# Patient Record
Sex: MALE | Race: OTHER
[De-identification: names, ages, dates, MRNs, and addresses within clinical notes are randomized per-mention and may not be internally consistent; named-entity substitution may affect disease eponyms.]

---

## 2017-09-12 ENCOUNTER — HOSPITAL ENCOUNTER (OUTPATIENT)
Dept: HOSPITAL 74 - JASU-ENDO | Age: 57
Discharge: HOME | End: 2017-09-12
Attending: INTERNAL MEDICINE
Payer: COMMERCIAL

## 2017-09-12 VITALS — SYSTOLIC BLOOD PRESSURE: 109 MMHG | HEART RATE: 65 BPM | DIASTOLIC BLOOD PRESSURE: 68 MMHG

## 2017-09-12 VITALS — BODY MASS INDEX: 30.4 KG/M2

## 2017-09-12 VITALS — TEMPERATURE: 97.8 F

## 2017-09-12 DIAGNOSIS — K29.50: Primary | ICD-10-CM

## 2017-09-12 PROCEDURE — 0DB68ZX EXCISION OF STOMACH, VIA NATURAL OR ARTIFICIAL OPENING ENDOSCOPIC, DIAGNOSTIC: ICD-10-PCS | Performed by: INTERNAL MEDICINE

## 2017-09-12 PROCEDURE — 0DB58ZX EXCISION OF ESOPHAGUS, VIA NATURAL OR ARTIFICIAL OPENING ENDOSCOPIC, DIAGNOSTIC: ICD-10-PCS | Performed by: INTERNAL MEDICINE

## 2017-09-13 NOTE — PATH
Surgical Pathology Report



Patient Name:  JOSTIN TRUJILLO

Accession #:  [id number]

[facility]. Rec. #:  [id number]                                                        

   /Age/Gender:  1960 (Age: 57) / M

Account:  [id number]                                                          

             Location: [facility]-ENDOSCOPY

Taken:  2017

Received:  2017

Reported:  2017

Physicians:  Ernesto Cooper M.D.

  



Specimen(s) Received

A: BX ERYTHEMA BODY 

B: BX GE JUNCTION 





Clinical History

Epigastric pain

Erythema body, irregular Z-line







Final Diagnosis

A. STOMACH, BODY, ERYTHEMA, BIOPSY:  

GASTRIC OXYNTIC MUCOSA WITH MILD TO MODERATE CHRONIC GASTRITIS WITH FOCAL

SURFACE EROSION.

IMMUNOSTAIN FOR H. PYLORI IS NEGATIVE FOR ORGANISMS.



B. GE JUNCTION, IRREGULAR Z-LINE, BIOPSY:  

SQUAMOCOLUMNAR JUNCTIONAL MUCOSA IS ACTIVE AND CHRONIC INFLAMMATION AND REFLUX

TYPE CHANGES.

NO INTESTINAL METAPLASIA (OTERO'S ESOPHAGUS) IDENTIFIED.





***Electronically Signed***

Alexander Finkelstein, M.D.





Gross Description

A.  Received in formalin, labeled "biopsy erosion body" are 2 tan, irregular

portions of soft tissue measuring 0.2 and 0.5 cm in greatest dimension. The

specimens are submitted in toto in one cassette.



B.  Received in formalin, labeled "biopsy of GE junction irregular Z-line" are 2

tan, irregular portions of soft tissue averaging 0.3 cm in greatest dimension.

The specimens are submitted in toto in one cassette.

## 2017-10-29 ENCOUNTER — HOSPITAL ENCOUNTER (OUTPATIENT)
Dept: HOSPITAL 74 - JER | Age: 57
Setting detail: OBSERVATION
LOS: 1 days | Discharge: LEFT BEFORE DISCHARGE | End: 2017-10-30
Attending: NURSE PRACTITIONER | Admitting: INTERNAL MEDICINE
Payer: COMMERCIAL

## 2017-10-29 VITALS — TEMPERATURE: 98.6 F

## 2017-10-29 VITALS — BODY MASS INDEX: 30.4 KG/M2

## 2017-10-29 DIAGNOSIS — Z87.891: ICD-10-CM

## 2017-10-29 DIAGNOSIS — R07.89: Primary | ICD-10-CM

## 2017-10-29 DIAGNOSIS — K21.9: ICD-10-CM

## 2017-10-29 DIAGNOSIS — E78.5: ICD-10-CM

## 2017-10-29 DIAGNOSIS — I95.9: ICD-10-CM

## 2017-10-29 LAB
ALBUMIN SERPL-MCNC: 3.9 G/DL (ref 3.4–5)
ALP SERPL-CCNC: 62 U/L (ref 45–117)
ALT SERPL-CCNC: 22 U/L (ref 12–78)
ANION GAP SERPL CALC-SCNC: 7 MMOL/L (ref 8–16)
AST SERPL-CCNC: 20 U/L (ref 15–37)
BASOPHILS # BLD: 0.8 % (ref 0–2)
BILIRUB SERPL-MCNC: 0.4 MG/DL (ref 0.2–1)
CALCIUM SERPL-MCNC: 8.9 MG/DL (ref 8.5–10.1)
CK SERPL-CCNC: 120 IU/L (ref 39–308)
CO2 SERPL-SCNC: 29 MMOL/L (ref 21–32)
CREAT SERPL-MCNC: 0.8 MG/DL (ref 0.7–1.3)
DEPRECATED RDW RBC AUTO: 12.5 % (ref 11.9–15.9)
EOSINOPHIL # BLD: 1.4 % (ref 0–4.5)
GLUCOSE SERPL-MCNC: 86 MG/DL (ref 74–106)
INR BLD: 1.02 (ref 0.82–1.09)
MAGNESIUM SERPL-MCNC: 2.4 MG/DL (ref 1.8–2.4)
MCH RBC QN AUTO: 29.3 PG (ref 25.7–33.7)
MCHC RBC AUTO-ENTMCNC: 34.4 G/DL (ref 32–35.9)
MCV RBC: 85.3 FL (ref 80–96)
NEUTROPHILS # BLD: 50.6 % (ref 42.8–82.8)
PLATELET # BLD AUTO: 224 K/MM3 (ref 134–434)
PMV BLD: 7.4 FL (ref 7.5–11.1)
PROT SERPL-MCNC: 7.1 G/DL (ref 6.4–8.2)
PT PNL PPP: 11.5 SEC (ref 9.98–11.88)
TROPONIN I SERPL-MCNC: < 0.02 NG/ML (ref 0–0.05)
WBC # BLD AUTO: 6.2 K/MM3 (ref 4–10)

## 2017-10-29 PROCEDURE — G0378 HOSPITAL OBSERVATION PER HR: HCPCS

## 2017-10-29 NOTE — HP
CHIEF COMPLAINT: Chest Pain





PCP: Dr. Joavna Roa





HISTORY OF PRESENT ILLNESS:


This is a 56 y/o man with a past medical history of Hypotension, Hyperlipidemia

, GERD, Renal Calculi. Who presents to the ED with left sided non-radiating 

chest pain x 1 day. Patient reports the pain as sharp, stabbing and constant 

since 10 am yesterday. Patient denies fever, chills, cough, dizziness, SOB, AP, 

N/V/D, constipation. Patient reports wearing a Holter Monitor for 1 week- 

Tachycardia, 8 weeks ago- no further treatment. Patient is scheduled for a 

Stress Test this week with Cardiology. 





ER course was notable for:


(1) Troponin I- 0.02


(2) EKG- NSR 70 bpm, no ST or TWI


(3) Chest Xray- image no infiltrate or effusion





Recent Travel: None





PAST MEDICAL HISTORY:


See HPI





PAST SURGICAL HISTORY:


Lithotripsy





Social History:


Smoking: Former 35 year hx, quit 12 years ago


Alcohol: None


Drugs:   None


Lives with spouse





Family History:


Brother: Cancer, 





Allergies





No Known Allergies Allergy (Verified 10/29/17 16:02)


 








HOME MEDICATIONS:


 Home Medications











 Medication  Instructions  Recorded


 


Aspirin [ASA -] 81 mg PO HS 14








REVIEW OF SYSTEMS


CONSTITUTIONAL: 


Absent:  fever, chills, diaphoresis, generalized weakness, malaise, loss of 

appetite, weight change


HEENT: 


Absent:  rhinorrhea, nasal congestion, throat pain, throat swelling, difficulty 

swallowing, mouth swelling, ear pain, eye pain, visual changes


CARDIOVASCULAR:  chest pain


Absent: syncope, palpitations, irregular heart rate, lightheadedness, 

peripheral edema


RESPIRATORY: 


Absent: cough, shortness of breath, dyspnea with exertion, orthopnea, wheezing, 

stridor, hemoptysis


GASTROINTESTINAL:


Absent: abdominal pain, abdominal distension, nausea, vomiting, diarrhea, 

constipation, melena, hematochezia


GENITOURINARY: 


Absent: dysuria, frequency, urgency, hesitancy, hematuria, flank pain, genital 

pain


MUSCULOSKELETAL: 


Absent: myalgia, arthralgia, joint swelling, back pain, neck pain


SKIN: 


Absent: rash, itching, pallor


HEMATOLOGIC/IMMUNOLOGIC: 


Absent: easy bleeding, easy bruising, lymphadenopathy, frequent infections


ENDOCRINE:


Absent: unexplained weight gain, unexplained weight loss, heat intolerance, 

cold intolerance


NEUROLOGIC: 


Absent: headache, focal weakness or paresthesias, dizziness, unsteady gait, 

seizure, mental status changes, bladder or bowel incontinence


PSYCHIATRIC: 


Absent: anxiety, depression, suicidal or homicidal ideation, hallucinations.








PHYSICAL EXAMINATION


 Vital Signs - 24 hr











  10/29/17 10/29/17





  16:02 20:36


 


Temperature 98.6 F 


 


Pulse Rate 80 


 


Pulse Rate [  70





Apical]  


 


Respiratory 16 18





Rate  


 


Blood Pressure 109/71 


 


Blood Pressure  109/78





[Right Arm]  


 


O2 Sat by Pulse 100 100





Oximetry (%)  











GENERAL: Awake, alert, and fully oriented, in no acute distress.


HEAD: Normal with no signs of trauma.


EYES: Pupils equal, round and reactive to light, extraocular movements intact, 

sclera anicteric, conjunctiva clear. No lid lag.


EARS, NOSE, THROAT: Ears normal, nares patent, oropharynx clear without 

exudates. Moist mucous membranes.


NECK: Normal range of motion, supple without lymphadenopathy, JVD, or masses.


LUNGS: Breath sounds equal, clear to auscultation bilaterally. No wheezes, and 

no crackles. No accessory muscle use.


HEART: Regular rate and rhythm, normal S1 and S2 without murmur, rub or gallop. 

CP non-reproducible upon palpation


ABDOMEN: Soft, nontender, not distended, normoactive bowel sounds, no guarding, 

no rebound, no masses.  No hepatomegaly or  splenomegaly. 


MUSCULOSKELETAL: Normal range of motion at all joints. No bony deformities or 

tenderness. No CVA tenderness.


UPPER EXTREMITIES: 2+ pulses, warm, well-perfused. No cyanosis. No clubbing. No 

peripheral edema.


LOWER EXTREMITIES: 2+ pulses, warm, well-perfused. No calf tenderness. No 

peripheral edema. 


NEUROLOGICAL:  Cranial nerves II-XII intact. Normal speech. Gait not observed.


PSYCHIATRIC: Cooperative. Good eye contact. Appropriate mood and affect.


SKIN: Warm, dry, normal turgor, no rashes or lesions noted, normal capillary 

refill. 





 Laboratory Results - last 24 hr











  10/29/17 10/29/17 10/29/17





  18:27 18:27 18:27


 


WBC  6.2  


 


RBC  5.08  


 


Hgb  14.9  


 


Hct  43.3  


 


MCV  85.3  


 


MCH  29.3  


 


MCHC  34.4  


 


RDW  12.5  


 


Plt Count  224  


 


MPV  7.4 L  


 


Neutrophils %  50.6  


 


Lymphocytes %  40.2 H  


 


Monocytes %  7.0  


 


Eosinophils %  1.4  


 


Basophils %  0.8  


 


PT with INR   11.50 


 


INR   1.02 


 


Sodium    142


 


Potassium    4.9


 


Chloride    106


 


Carbon Dioxide    29


 


Anion Gap    7 L


 


BUN    11  D


 


Creatinine    0.8


 


Creat Clearance w eGFR    > 60


 


Random Glucose    86


 


Calcium    8.9


 


Magnesium    2.4


 


Total Bilirubin    0.4  D


 


AST    20


 


ALT    22


 


Alkaline Phosphatase    62


 


Creatine Kinase    120


 


Troponin I    < 0.02


 


Total Protein    7.1


 


Albumin    3.9











ASSESSMENT/PLAN:


This is a 56 y/o man with a PMHx of: Hypotension, HLD, GERD, Renal Stones. 

Placed on Tele Observation for Chest Pain r/o ACS for further evaluation of 

their emergent condition.





Plan:





1. Chest Pain


- r/o ACS


- HEART Score 3


- EDILSON Score 24 


- Tele monitoring


- Serial Enzymes


- Lipid Panel, HgbA1C in am


- Appreciate Cardiac Consult


- Continue Asa


- Morphine Sulfate prn





2. Hyperlipidemia


- Continue atorvastatin


- Lipid Panel in am


- Monitor LFTs





3. GERD 


- Stable


- no current home med


- Consider H2 blocker or PPI prn





4. Hypotension


- Hemodynamically stable


- Will continue to monitor and treat with interventions accordingly





5. FEN


- Tolerates PO Fluids


- Replete lytes prn


- Low Na, Low Cholesterol Diet





6. DVT Prophylaxis


- OOB


- SCDs


- Consider ACs if LOS > 48





Code Status: Full Code











Problem List





- Problem


(1) Chest pain


Code(s): R07.9 - CHEST PAIN, UNSPECIFIED   Qualifiers: 


     Chest pain type: precordial pain     Qualified Code(s): R07.2 - Precordial 

pain; R07.2 - Precordial pain  





(2) Hyperlipidemia


Code(s): E78.5 - HYPERLIPIDEMIA, UNSPECIFIED   





(3) GERD (gastroesophageal reflux disease)


Code(s): K21.9 - GASTRO-ESOPHAGEAL REFLUX DISEASE WITHOUT ESOPHAGITIS   





(4) H/O hypotension


Code(s): Z86.79 - PERSONAL HISTORY OF OTHER DISEASES OF THE CIRCULATORY SYSTEM








Visit type





- Emergency Visit


Emergency Visit: Yes


ED Registration Date: 10/30/17


Care time: The patient presented to the Emergency Department on the above date 

and was hospitalized for further evaluation of their emergent condition.





- New Patient


This patient is new to me today: Yes


Date on this admission: 10/30/17





- Critical Care


Critical Care patient: No

## 2017-10-29 NOTE — PDOC
History of Present Illness





- General


History Source: Patient


Exam Limitations: No Limitations





<Paz Ugalde - Last Filed: 10/29/17 18:13>





<Jerrica Yip - Last Filed: 10/30/17 01:41>





- General


Chief Complaint: Chest Pain


Stated Complaint: CHEST PAIN


Time Seen by Provider: 10/29/17 17:04





- History of Present Illness


Initial Comments: 





10/29/17 18:13


The patient is a 57 year old male with a significant past medical history of 

hyperlipidemia who presents to the ED with complaints of chest pain since 

earlier today. The patient reports a sudden onset of left sided non-radiating 

chest pain around 10:30 am this morning. He states his chest pain lasted for 

several minutes and he took an aspirin before symptoms resided. Patient reports 

another episode of chest pain around 12:30 pm earlier today. Patient describes 

the chest pain as sharp, a 7/10 in severity, and states he has never felt 

anything like this before.


 Patient had a recent Holter Monitor for a week that only showed tachycardia. 

Patient was told to take a daily aspirin by his cardiologist. He states he had 

a Stress Test done 4 years ago that showed normal results. 


Denies shortness of breath or palpitations. Denies nausea, vomiting, or 

diarrhea. Denies dysuria or change in urinary output. Denies lightheadedness or 

headache. Denies any other symptoms. 





Cardiologist: Dr. Jean-Pierre Florence (affiliated with [phone]) 








 (Paz Ugalde)








Past History





<Paz Ugalde - Last Filed: 10/29/17 18:13>





- Past Medical History


Anemia: No


Asthma: No


Cancer: No


Cardiac Disorders: No


CVA: No


COPD: No


CHF: No


Dementia: No


Diabetes: No


GI Disorders: No


 Disorders: No


HTN: No


Hypercholesterolemia: Yes


Kidney Stones: Yes


Liver Disease: No


Seizures: No


Thyroid Disease: No





- Immunization History


Immunization Up to Date: Yes





- Suicide/Smoking/Psychosocial Hx


Smoking Status: No


Smoking History: Never smoked


Have you smoked in the past 12 months: No


Number of Cigarettes Smoked Daily: 0


If you are a former smoker, when did you quit?: 12yrs ago


Information on smoking cessation initiated: No


Hx Alcohol Use: No


Drug/Substance Use Hx: No


Substance Use Type: Alcohol





<Jerrica Yip - Last Filed: 10/30/17 01:41>





- Past Medical History


Allergies/Adverse Reactions: 


 Allergies











Allergy/AdvReac Type Severity Reaction Status Date / Time


 


No Known Allergies Allergy   Verified 10/29/17 16:02











Home Medications: 


Ambulatory Orders





Aspirin [ASA -] 81 mg PO HS 09/09/14 











Cardiac Specific PMH





- Complaint Specific PMHX


Pacemaker: No





<Jerrica Yip - Last Filed: 10/30/17 01:41>





**Review of Systems





- Review of Systems


Able to Perform ROS?: Yes


All Other Systems: Reviewed and Negative





<Paz Ugalde - Last Filed: 10/29/17 18:13>





<Jerrica Yip - Last Filed: 10/30/17 01:41>





- Review of Systems


Comments:: 





10/29/17 18:13





CONSTITUTIONAL:


No reported: Fever, Chills, Diaphoresis, Generalized Weakness, Malaise, Loss of 

Appetite


HEENT:


No reported: Rhinorrhea, Nasal Congestion, Throat Pain, Throat Swelling, 

Difficulty Swallowing, Mouth Swelling, Ear Pain, Eye Pain, Visual Changes


CARDIOVASCULAR: + chest pain 


No reported: Syncope, Palpitations, Irregular Heart Rate, Lightheadedness, 

Peripheral Edema


RESPIRATORY:


No reported: Cough, Shortness of Breath, SOB with Exertion, Orthopnea, Wheezing

, Stridor, Hemoptysis


GASTROINTESTINAL:


No reported: Abdominal pain, Abdominal Distension, Nausea, Vomiting, Diarrhea, 

Constipation, Melena, Hematochezia


GENITOURINARY:


No reported: Dysuria, Frequency, Urgency, Hesitancy, Flank Pain, Genital Pain


MUSCULOSKELETAL:


No reported: Myalgia, Arthralgia, Joint Swelling, Back pain, Neck Pain


SKIN:


No reported: Rash, Itching, Pallor


HEMEATOLOGIC/IMMUNOLOGIC:


No reported: Easy Bleeding, Easy Bruising, Lymphadenopathy, Frequent infections


ENDOCRINE:


No reported: Unexplained Weight Gain, Unexplained Weight Loss, Heat Intolerance

, Cold Intolerance


NEUROLOGIC:


No reported: Headache, Focal Weakness, Paresthesias, Vertigo, Lightheadedness, 

Unsteady Gait, Seizure, Mental Status Changes, Incontinence


PSYCHIATRIC:


No reported: Anxiety, Depression 








 (Paz Ugalde)








*Physical Exam





<Paz Ugalde - Last Filed: 10/29/17 18:13>





<Jerrica Yip - Last Filed: 10/30/17 01:41>





- Vital Signs


 Last Vital Signs











Temp Pulse Resp BP Pulse Ox


 


 98.6 F   70   18   109/78   100 


 


 10/29/17 16:02  10/29/17 20:36  10/29/17 20:36  10/29/17 20:36  10/29/17 20:36

















- Physical Exam


Comments: 





10/29/17 18:13


GENERAL:


Well developed, well nourished. Awake and alert. No acute distress.


HEENT:


Normocephalic, atraumatic. PERRLA, EOMI. No conjunctival pallor. Sclera are non-

icteric. Moist mucous membranes. Oropharynx is clear.


NECK: 


Supple. Full ROM. No JVD. Carotid pulses 2+ and symmetric, without bruits. No 

thyromegaly. No lymphadenopathy.


CARDIOVASCULAR:


Regular rate and rhythm. No murmurs, rubs, or gallops. Distal pulses are 2+ and 

symmetric. 


PULMONARY: 


No evidence of respiratory distress. Lungs clear to auscultation bilaterally. 

No wheezing, rales or rhonchi.


ABDOMINAL:


Soft. Non-tender. Non-distended. No rebound or guarding. No organomegaly. 

Normoactive bowel sounds. 


MUSCULOSKELETAL 


Normal range of motion at all joints. No bony deformities or tenderness. No CVA 

tenderness.


EXTREMITIES: 


No cyanosis. No clubbing. No edema. No calf tenderness.


SKIN: + multiple circular bruising patterns on back as a result of cupping 


Warm and dry. Normal capillary refill. No rashes. No jaundice. 


NEUROLOGICAL: 


Alert, awake, appropriate. Cranial nerves 2-12 intact. No deficits to light 

touch and temperature in face, upper extremities and lower extremities. No 

motor deficits in the in face, upper extremities and lower extremities. 

Normoreflexic in the upper and lower extremities. Normal speech. Toes are down-

going bilaterally. Gait is normal without ataxia.


PSYCHIATRIC: 


Cooperative. Good eye contact. Appropriate mood and affect. (Paz Ugalde)








**Heart Score/ECG Review





- History


History: Moderately suspicious





- Electrocardiogram


EKG: Normal





- Age


Age: 45-65





- Risk Factors


Risk Factors Heart Score: Yes Hx Hypercholesterolemia, Yes Smoking History


Based on the list above the patient has:: 1-2 risk factors





- Troponin


Troponin: </= normal limit





- Score


Heart Score - Total: 3





- ECG Intrepretation


Rhythm: Regular Rhythm





- Axis


Axis: Normal





- ST and T


Prolonged Q-T Interval: No





- ECG Impressions


Bradycardia: No


Torsades january Pointes: No


WPW: No





<Jerrica Yip - Last Filed: 10/30/17 01:41>





ED Treatment Course





- LABORATORY


CBC & Chemistry Diagram: 


 10/29/17 18:27





 10/29/17 18:27





<Jerrica Yip - Last Filed: 10/30/17 01:41>





- ADDITIONAL ORDERS


Additional order review: 


 Laboratory  Results











  10/29/17 10/29/17





  18:27 18:27


 


PT with INR   11.50


 


INR   1.02


 


Sodium  142 


 


Potassium  4.9 


 


Chloride  106 


 


Carbon Dioxide  29 


 


Anion Gap  7 L 


 


BUN  11  D 


 


Creatinine  0.8 


 


Creat Clearance w eGFR  > 60 


 


Random Glucose  86 


 


Calcium  8.9 


 


Magnesium  2.4 


 


Total Bilirubin  0.4  D 


 


AST  20 


 


ALT  22 


 


Alkaline Phosphatase  62 


 


Creatine Kinase  120 


 


Troponin I  < 0.02 


 


Total Protein  7.1 


 


Albumin  3.9 








 











  10/29/17





  18:27


 


RBC  5.08


 


MCV  85.3


 


MCHC  34.4


 


RDW  12.5


 


MPV  7.4 L


 


Neutrophils %  50.6


 


Lymphocytes %  40.2 H


 


Monocytes %  7.0


 


Eosinophils %  1.4


 


Basophils %  0.8

















- RADIOLOGY


Radiology Studies Ordered: 














 Category Date Time Status


 


 CHEST PA & LAT [RAD] Stat Radiology  10/29/17 18:06 Taken

















- Medications


Given in the ED: 


ED Medications














Discontinued Medications














Generic Name Dose Route Start Last Admin





  Trade Name Freq  PRN Reason Stop Dose Admin


 


Aspirin  162 mg 10/29/17 18:06 10/29/17 18:16





  Asa -  PO 10/29/17 18:07  162 mg





  ONCE ONE   Administration

















Medical Decision Making





<Paz Ugalde - Last Filed: 10/29/17 18:13>





<Jerrica Yip - Last Filed: 10/30/17 01:41>





- Medical Decision Making





10/29/17 21:53


I SPOKE W COVERING CARDIOLOGIST FOR DR Florence AND PT WILL  BE obs TELE, 


PLAN  - IF HE RULES OUT TO HAVE HIM FOLLOW UP WITH dR Florence LATER THIS WEEK FIR A 

STRESS TEST (Jerrica Yip)








*DC/Admit/Observation/Transfer





<Paz Ugalde - Last Filed: 10/29/17 18:13>





- Discharge Dispostion


Admit: Yes





<Jerrica Yip - Last Filed: 10/30/17 01:41>


Diagnosis at time of Disposition: 


Chest pain


Qualifiers:


 Chest pain type: precordial pain Qualified Code(s): R07.2 - Precordial pain; 

R07.2 - Precordial pain





- Referrals


Referrals: 


Jovana Roa MD [Primary Care Provider] - 





- Attestations


Scribe Attestion: 





10/29/17 18:14





Documentation prepared by Paz Ugalde, acting as medical scribe for Jerrica Yip MD (Paz Ugalde)

## 2017-10-30 VITALS — HEART RATE: 72 BPM | SYSTOLIC BLOOD PRESSURE: 103 MMHG | DIASTOLIC BLOOD PRESSURE: 69 MMHG

## 2017-10-30 LAB
ANION GAP SERPL CALC-SCNC: 9 MMOL/L (ref 8–16)
BASOPHILS # BLD: 0.9 % (ref 0–2)
CALCIUM SERPL-MCNC: 8.4 MG/DL (ref 8.5–10.1)
CHOLEST SERPL-MCNC: 180 MG/DL (ref 50–200)
CK SERPL-CCNC: 89 IU/L (ref 39–308)
CO2 SERPL-SCNC: 28 MMOL/L (ref 21–32)
CREAT SERPL-MCNC: 0.7 MG/DL (ref 0.7–1.3)
DEPRECATED RDW RBC AUTO: 13 % (ref 11.9–15.9)
EOSINOPHIL # BLD: 2.1 % (ref 0–4.5)
GLUCOSE SERPL-MCNC: 92 MG/DL (ref 74–106)
MAGNESIUM SERPL-MCNC: 2.4 MG/DL (ref 1.8–2.4)
MCH RBC QN AUTO: 29.2 PG (ref 25.7–33.7)
MCHC RBC AUTO-ENTMCNC: 34.2 G/DL (ref 32–35.9)
MCV RBC: 85.5 FL (ref 80–96)
NEUTROPHILS # BLD: 40.9 % (ref 42.8–82.8)
PHOSPHATE SERPL-MCNC: 4 MG/DL (ref 2.5–4.9)
PLATELET # BLD AUTO: 219 K/MM3 (ref 134–434)
PMV BLD: 7.4 FL (ref 7.5–11.1)
TROPONIN I SERPL-MCNC: < 0.02 NG/ML (ref 0–0.05)
WBC # BLD AUTO: 6.7 K/MM3 (ref 4–10)

## 2017-10-30 NOTE — EKG
Test Reason : 

Blood Pressure : ***/*** mmHG

Vent. Rate : 070 BPM     Atrial Rate : 070 BPM

   P-R Int : 130 ms          QRS Dur : 092 ms

    QT Int : 394 ms       P-R-T Axes : 007 017 003 degrees

   QTc Int : 425 ms

 

NORMAL SINUS RHYTHM

NORMAL ECG

WHEN COMPARED WITH ECG OF 13-AUG-2016 03:49,

NO SIGNIFICANT CHANGE WAS FOUND

Confirmed by CARLO BOYLE MD ([id number]) on 10/30/2017 2:45:18 PM

 

Referred By:             Confirmed By:CARLO BOYLE MD

## 2017-10-30 NOTE — CONSULT
Consult - text type





- Consultation


Consultation Note: 





Cardiology





HISTORY OF PRESENT ILLNESS:


This is a 56 y/o man with a past medical history of Hypotension, Hyperlipidemia

, GERD, Renal Calculi. Who presents to the ED with left sided non-radiating 

chest pain x 1 day. Patient reports the pain as sharp, stabbing and constant 

since 10 am yesterday. Patient denies fever, chills, cough, dizziness, SOB, AP, 

N/V/D, constipation. Patient reports wearing a Holter Monitor for 1 week- 

Tachycardia, 8 weeks ago- no further treatment. Patient is scheduled for a 

Stress Test this week with Cardiology. 





Recent Travel: None





PAST MEDICAL HISTORY:


See HPI





PAST SURGICAL HISTORY:


Lithotripsy





Social History:


Smoking: Former 35 year hx, quit 12 years ago


Alcohol: None


Drugs:   None


Lives with spouse





Family History:


Brother: Cancer, 





Allergies





No Known Allergies Allergy 





PE


normal vitals 


normal cardiopulmonary exam


abdomen soft


no leg edema





Impression:


atypical chest pains, No MI or CHF


EKG documented normal, but I have not seen


anxious to be discharged





Rec:


Discharge with close cardiac follow-up

## 2017-10-30 NOTE — DS
Physical Exam: 


SUBJECTIVE: Patient seen and examined








OBJECTIVE:





 Vital Signs











 Period  Temp  Pulse  Resp  BP Sys/Fraire  Pulse Ox


 


 Last 24 Hr    66  18  103/60  95








PHYSICAL EXAM





GENERAL: The patient is awake, alert, and fully oriented, in no acute distress.


HEAD: Normal with no signs of trauma.


EYES: PERRL, extraocular movements intact, sclera anicteric, conjunctiva clear. 


ENT: Ears normal, nares patent, oropharynx clear without exudates, moist mucous 

membranes.


NECK: Trachea midline, full range of motion, supple. 


LUNGS: Breath sounds equal, clear to auscultation bilaterally, no wheezes, no 

crackles, no accessory muscle use. 


HEART: Regular rate and rhythm, S1, S2 without murmur, rub or gallop.


ABDOMEN: Soft, nontender, nondistended, normoactive bowel sounds, no guarding, 

no rebound, no hepatosplenomegaly, no masses.


EXTREMITIES: 2+ pulses, warm, well-perfused, no edema. 


NEUROLOGICAL: Cranial nerves II through XII grossly intact. Normal speech, gait 

not observed.


PSYCH: Normal mood, normal affect.


SKIN: Warm, dry, normal turgor, no rashes or lesions noted.





LABS


 Laboratory Results - last 24 hr











  10/30/17 10/30/17 10/30/17





  06:54 06:54 06:54


 


WBC  6.7  


 


RBC  5.12  


 


Hgb  15.0  


 


Hct  43.8  


 


MCV  85.5  


 


MCH  29.2  


 


MCHC  34.2  


 


RDW  13.0  


 


Plt Count  219  


 


MPV  7.4 L  


 


Neutrophils %  40.9 L  


 


Lymphocytes %  48.5 H D  


 


Monocytes %  7.6  


 


Eosinophils %  2.1  


 


Basophils %  0.9  


 


Sodium   141 


 


Potassium   4.0 


 


Chloride   104 


 


Carbon Dioxide   28 


 


Anion Gap   9 


 


BUN   12 


 


Creatinine   0.7 


 


Random Glucose   92 


 


Hemoglobin A1c %   


 


Calcium   8.4 L 


 


Phosphorus   4.0 


 


Magnesium   2.4 


 


Creatine Kinase    89


 


Troponin I    < 0.02


 


Triglycerides    209 H D


 


Cholesterol    180


 


Total LDL Cholesterol    118 H


 


HDL Cholesterol    32 L














  10/30/17





  06:54


 


WBC 


 


RBC 


 


Hgb 


 


Hct 


 


MCV 


 


MCH 


 


MCHC 


 


RDW 


 


Plt Count 


 


MPV 


 


Neutrophils % 


 


Lymphocytes % 


 


Monocytes % 


 


Eosinophils % 


 


Basophils % 


 


Sodium 


 


Potassium 


 


Chloride 


 


Carbon Dioxide 


 


Anion Gap 


 


BUN 


 


Creatinine 


 


Random Glucose 


 


Hemoglobin A1c %  5.4


 


Calcium 


 


Phosphorus 


 


Magnesium 


 


Creatine Kinase 


 


Troponin I 


 


Triglycerides 


 


Cholesterol 


 


Total LDL Cholesterol 


 


HDL Cholesterol 











HOSPITAL COURSE:





Date of Admission:10/30/17





Date of Discharge: 10/30/17








Had chest pain throughout the night.





Advised of risks of AMA including MI, stroke, death.





Wants to follow up with PCP Dr.Richard Rivas.





Discharge Summary


Reason For Visit: CHEST PAIN


Current Active Problems





Chest pain (Acute) 


GERD (gastroesophageal reflux disease) (Acute) 


H/O hypotension (Acute) 








Condition: Guarded





- Instructions


Referrals: 


Jean-Pierre Rivas [Non Staff, Medical] - 


Disposition: AGAINST MEDICAL ADVICE





- Home Medications


Comprehensive Discharge Medication List: 


Ambulatory Orders





Aspirin [ASA -] 81 mg PO HS 09/09/14

## 2022-02-15 ENCOUNTER — HOSPITAL ENCOUNTER (OUTPATIENT)
Dept: HOSPITAL 74 - FER | Age: 62
Setting detail: OBSERVATION
LOS: 1 days | Discharge: HOME | End: 2022-02-16
Attending: NURSE PRACTITIONER | Admitting: INTERNAL MEDICINE
Payer: COMMERCIAL

## 2022-02-15 VITALS — BODY MASS INDEX: 32.5 KG/M2

## 2022-02-15 DIAGNOSIS — I95.9: ICD-10-CM

## 2022-02-15 DIAGNOSIS — Z87.891: ICD-10-CM

## 2022-02-15 DIAGNOSIS — Z86.73: ICD-10-CM

## 2022-02-15 DIAGNOSIS — E66.9: ICD-10-CM

## 2022-02-15 DIAGNOSIS — K21.9: ICD-10-CM

## 2022-02-15 DIAGNOSIS — E78.5: ICD-10-CM

## 2022-02-15 DIAGNOSIS — R42: Primary | ICD-10-CM

## 2022-02-15 DIAGNOSIS — N20.0: ICD-10-CM

## 2022-02-15 LAB
ALBUMIN SERPL-MCNC: 4.3 G/DL (ref 3.4–5)
ALP SERPL-CCNC: 59 U/L (ref 45–117)
ALT SERPL-CCNC: 16 U/L (ref 13–61)
ANION GAP SERPL CALC-SCNC: 11 MMOL/L (ref 8–16)
AST SERPL-CCNC: 22 U/L (ref 15–37)
BASOPHILS # BLD: 0.8 % (ref 0–2)
BILIRUB SERPL-MCNC: 0.5 MG/DL (ref 0.2–1)
BUN SERPL-MCNC: 15 MG/DL (ref 7–18)
CALCIUM SERPL-MCNC: 9.7 MG/DL (ref 8.5–10)
CHLORIDE SERPL-SCNC: 100 MMOL/L (ref 98–107)
CO2 SERPL-SCNC: 28 MMOL/L (ref 21–32)
CREAT SERPL-MCNC: 1.2 MG/DL (ref 0.55–1.3)
DEPRECATED RDW RBC AUTO: 13.2 % (ref 11.9–15.9)
EOSINOPHIL # BLD: 1.9 % (ref 0–4.5)
GLUCOSE SERPL-MCNC: 136 MG/DL (ref 74–106)
HCT VFR BLD CALC: 43.8 % (ref 35.4–49)
HGB BLD-MCNC: 14.7 GM/DL (ref 11.7–16.9)
INR BLD: 1.02 (ref 0.83–1.09)
LYMPHOCYTES # BLD: 43.8 % (ref 8–40)
MCH RBC QN AUTO: 28.9 PG (ref 25.7–33.7)
MCHC RBC AUTO-ENTMCNC: 33.7 G/DL (ref 32–35.9)
MCV RBC: 85.7 FL (ref 80–96)
MONOCYTES # BLD AUTO: 7.4 % (ref 3.8–10.2)
NEUTROPHILS # BLD: 46.1 % (ref 42.8–82.8)
PLATELET # BLD AUTO: 267 10^3/UL (ref 134–434)
PMV BLD: 7.4 FL (ref 7.5–11.1)
PROT SERPL-MCNC: 7.3 G/DL (ref 6.4–8.2)
PT PNL PPP: 11.7 SEC (ref 9.7–13)
RBC # BLD AUTO: 5.1 M/MM3 (ref 4–5.6)
SODIUM SERPL-SCNC: 139 MMOL/L (ref 136–145)
WBC # BLD AUTO: 7.1 K/MM3 (ref 4–10)

## 2022-02-15 PROCEDURE — U0003 INFECTIOUS AGENT DETECTION BY NUCLEIC ACID (DNA OR RNA); SEVERE ACUTE RESPIRATORY SYNDROME CORONAVIRUS 2 (SARS-COV-2) (CORONAVIRUS DISEASE [COVID-19]), AMPLIFIED PROBE TECHNIQUE, MAKING USE OF HIGH THROUGHPUT TECHNOLOGIES AS DESCRIBED BY CMS-2020-01-R: HCPCS

## 2022-02-15 PROCEDURE — 3E0337Z INTRODUCTION OF ELECTROLYTIC AND WATER BALANCE SUBSTANCE INTO PERIPHERAL VEIN, PERCUTANEOUS APPROACH: ICD-10-PCS | Performed by: NURSE PRACTITIONER

## 2022-02-15 PROCEDURE — C9803 HOPD COVID-19 SPEC COLLECT: HCPCS

## 2022-02-15 PROCEDURE — U0005 INFEC AGEN DETEC AMPLI PROBE: HCPCS

## 2022-02-15 PROCEDURE — G0378 HOSPITAL OBSERVATION PER HR: HCPCS

## 2022-02-16 VITALS — DIASTOLIC BLOOD PRESSURE: 64 MMHG | HEART RATE: 84 BPM | TEMPERATURE: 98.2 F | SYSTOLIC BLOOD PRESSURE: 104 MMHG

## 2022-02-16 LAB
ALBUMIN SERPL-MCNC: 3.9 G/DL (ref 3.4–5)
ALP SERPL-CCNC: 50 U/L (ref 45–117)
ALT SERPL-CCNC: 14 U/L (ref 13–61)
ANION GAP SERPL CALC-SCNC: 8 MMOL/L (ref 8–16)
AST SERPL-CCNC: 19 U/L (ref 15–37)
BASOPHILS # BLD: 0.5 % (ref 0–2)
BILIRUB SERPL-MCNC: 0.7 MG/DL (ref 0.2–1)
BUN SERPL-MCNC: 15 MG/DL (ref 7–18)
CALCIUM SERPL-MCNC: 9 MG/DL (ref 8.5–10)
CHLORIDE SERPL-SCNC: 101 MMOL/L (ref 98–107)
CHOLEST SERPL-MCNC: 230 MG/DL (ref 50–200)
CO2 SERPL-SCNC: 29 MMOL/L (ref 21–32)
CREAT SERPL-MCNC: 0.8 MG/DL (ref 0.55–1.3)
DEPRECATED RDW RBC AUTO: 13.2 % (ref 11.9–15.9)
EOSINOPHIL # BLD: 2.3 % (ref 0–4.5)
GLUCOSE SERPL-MCNC: 96 MG/DL (ref 74–106)
HCT VFR BLD CALC: 43.9 % (ref 35.4–49)
HDLC SERPL-MCNC: 36 MG/DL (ref 40–60)
HGB BLD-MCNC: 14.7 GM/DL (ref 11.7–16.9)
LDLC SERPL CALC-MCNC: 161 MG/DL (ref 5–100)
LYMPHOCYTES # BLD: 46.1 % (ref 8–40)
MCH RBC QN AUTO: 28.8 PG (ref 25.7–33.7)
MCHC RBC AUTO-ENTMCNC: 33.4 G/DL (ref 32–35.9)
MCV RBC: 86.2 FL (ref 80–96)
MONOCYTES # BLD AUTO: 8 % (ref 3.8–10.2)
NEUTROPHILS # BLD: 43.1 % (ref 42.8–82.8)
PLATELET # BLD AUTO: 231 10^3/UL (ref 134–434)
PMV BLD: 7.4 FL (ref 7.5–11.1)
PROT SERPL-MCNC: 6.6 G/DL (ref 6.4–8.2)
RBC # BLD AUTO: 5.1 M/MM3 (ref 4–5.6)
SODIUM SERPL-SCNC: 138 MMOL/L (ref 136–145)
TRIGL SERPL-MCNC: 163 MG/DL (ref 0–150)
WBC # BLD AUTO: 6 K/MM3 (ref 4–10)

## 2022-08-26 ENCOUNTER — HOSPITAL ENCOUNTER (EMERGENCY)
Dept: HOSPITAL 74 - JER | Age: 62
LOS: 1 days | Discharge: LEFT BEFORE DISCHARGE | End: 2022-08-27
Payer: COMMERCIAL

## 2022-08-26 VITALS
RESPIRATION RATE: 18 BRPM | SYSTOLIC BLOOD PRESSURE: 105 MMHG | DIASTOLIC BLOOD PRESSURE: 71 MMHG | TEMPERATURE: 98 F | HEART RATE: 105 BPM

## 2022-08-26 VITALS — BODY MASS INDEX: 31.3 KG/M2

## 2022-08-26 DIAGNOSIS — R07.9: Primary | ICD-10-CM

## 2022-08-26 LAB
ALBUMIN SERPL-MCNC: 3.6 G/DL (ref 3.4–5)
ALP SERPL-CCNC: 61 U/L (ref 45–117)
ALT SERPL-CCNC: 22 U/L (ref 13–61)
ANION GAP SERPL CALC-SCNC: 8 MMOL/L (ref 8–16)
AST SERPL-CCNC: 31 U/L (ref 15–37)
BASOPHILS # BLD: 0.9 % (ref 0–2)
BILIRUB SERPL-MCNC: 0.3 MG/DL (ref 0.2–1)
BUN SERPL-MCNC: 11.9 MG/DL (ref 7–18)
CALCIUM SERPL-MCNC: 8.5 MG/DL (ref 8.5–10.1)
CHLORIDE SERPL-SCNC: 104 MMOL/L (ref 98–107)
CO2 SERPL-SCNC: 28 MMOL/L (ref 21–32)
CREAT SERPL-MCNC: 0.7 MG/DL (ref 0.55–1.3)
DEPRECATED RDW RBC AUTO: 12.8 % (ref 11.9–15.9)
EOSINOPHIL # BLD: 0.8 % (ref 0–4.5)
GLUCOSE SERPL-MCNC: 96 MG/DL (ref 74–106)
HCT VFR BLD CALC: 44 % (ref 35.4–49)
HGB BLD-MCNC: 15 GM/DL (ref 11.7–16.9)
LYMPHOCYTES # BLD: 38.5 % (ref 8–40)
MAGNESIUM SERPL-MCNC: 2.1 MG/DL (ref 1.8–2.4)
MCH RBC QN AUTO: 30.4 PG (ref 25.7–33.7)
MCHC RBC AUTO-ENTMCNC: 34.1 G/DL (ref 32–35.9)
MCV RBC: 89.3 FL (ref 80–96)
MONOCYTES # BLD AUTO: 7.8 % (ref 3.8–10.2)
NEUTROPHILS # BLD: 52 % (ref 42.8–82.8)
PLATELET # BLD AUTO: 232 10^3/UL (ref 134–434)
PMV BLD: 7.2 FL (ref 7.5–11.1)
PROT SERPL-MCNC: 6.5 G/DL (ref 6.4–8.2)
RBC # BLD AUTO: 4.92 M/MM3 (ref 4–5.6)
SODIUM SERPL-SCNC: 140 MMOL/L (ref 136–145)
WBC # BLD AUTO: 7.4 K/MM3 (ref 4–10)

## 2022-09-23 ENCOUNTER — HOSPITAL ENCOUNTER (EMERGENCY)
Dept: HOSPITAL 74 - FER | Age: 62
Discharge: HOME | End: 2022-09-23
Payer: COMMERCIAL

## 2022-09-23 VITALS — DIASTOLIC BLOOD PRESSURE: 73 MMHG | SYSTOLIC BLOOD PRESSURE: 105 MMHG | HEART RATE: 66 BPM

## 2022-09-23 VITALS — TEMPERATURE: 99.7 F | RESPIRATION RATE: 16 BRPM

## 2022-09-23 VITALS — BODY MASS INDEX: 29 KG/M2

## 2022-09-23 DIAGNOSIS — E86.0: Primary | ICD-10-CM

## 2022-09-23 LAB
ALBUMIN SERPL-MCNC: 3.9 G/DL (ref 3.4–5)
ALP SERPL-CCNC: 46 U/L (ref 45–117)
ALT SERPL-CCNC: 17 U/L (ref 13–61)
ANION GAP SERPL CALC-SCNC: 4 MMOL/L (ref 8–16)
AST SERPL-CCNC: 23 U/L (ref 15–37)
BILIRUB SERPL-MCNC: 0.5 MG/DL (ref 0.2–1)
BUN SERPL-MCNC: 10 MG/DL (ref 7–18)
CALCIUM SERPL-MCNC: 9 MG/DL (ref 8.5–10)
CHLORIDE SERPL-SCNC: 101 MMOL/L (ref 98–107)
CO2 SERPL-SCNC: 29 MMOL/L (ref 21–32)
CREAT SERPL-MCNC: 0.8 MG/DL (ref 0.55–1.3)
DEPRECATED RDW RBC AUTO: 13.9 % (ref 11.9–15.9)
GLUCOSE SERPL-MCNC: 98 MG/DL (ref 74–106)
HCT VFR BLD CALC: 45.5 % (ref 35.4–49)
HGB BLD-MCNC: 16.2 G/DL (ref 11.7–16.9)
MCH RBC QN AUTO: 31.6 PG (ref 25.7–33.7)
MCHC RBC AUTO-ENTMCNC: 35.5 G/DL (ref 32–35.9)
MCV RBC: 89 FL (ref 80–96)
PLATELET # BLD AUTO: 210.8 10^3/UL (ref 134–434)
PLATELET BLD QL SMEAR: ADEQUATE
PMV BLD: 7 FL (ref 7.5–11.1)
PROT SERPL-MCNC: 6.6 G/DL (ref 6.4–8.2)
RBC # BLD AUTO: 5.11 10^6/UL (ref 4–5.6)
SODIUM SERPL-SCNC: 134 MMOL/L (ref 136–145)
WBC # BLD AUTO: 7.4 10^3/UL (ref 4–10.8)

## 2023-06-03 ENCOUNTER — HOSPITAL ENCOUNTER (EMERGENCY)
Dept: HOSPITAL 74 - JER | Age: 63
LOS: 1 days | Discharge: HOME | End: 2023-06-04
Payer: COMMERCIAL

## 2023-06-03 VITALS — BODY MASS INDEX: 30.7 KG/M2

## 2023-06-03 DIAGNOSIS — R50.9: ICD-10-CM

## 2023-06-03 DIAGNOSIS — R63.0: Primary | ICD-10-CM

## 2023-06-03 DIAGNOSIS — U07.1: ICD-10-CM

## 2023-06-03 DIAGNOSIS — R53.1: ICD-10-CM

## 2023-06-03 DIAGNOSIS — R09.3: ICD-10-CM

## 2023-06-03 DIAGNOSIS — R19.7: ICD-10-CM

## 2023-06-03 DIAGNOSIS — R94.31: ICD-10-CM

## 2023-06-03 DIAGNOSIS — R05.9: ICD-10-CM

## 2023-06-03 PROCEDURE — 3E033NZ INTRODUCTION OF ANALGESICS, HYPNOTICS, SEDATIVES INTO PERIPHERAL VEIN, PERCUTANEOUS APPROACH: ICD-10-PCS

## 2023-06-04 VITALS
TEMPERATURE: 98.9 F | RESPIRATION RATE: 18 BRPM | DIASTOLIC BLOOD PRESSURE: 57 MMHG | SYSTOLIC BLOOD PRESSURE: 95 MMHG | HEART RATE: 73 BPM

## 2023-06-04 LAB
ALBUMIN SERPL-MCNC: 3.4 G/DL (ref 3.4–5)
ALP SERPL-CCNC: 58 U/L (ref 45–117)
ALT SERPL-CCNC: 20 U/L (ref 13–61)
ANION GAP SERPL CALC-SCNC: 6 MMOL/L (ref 8–16)
AST SERPL-CCNC: 22 U/L (ref 15–37)
BASOPHILS # BLD: 0.3 % (ref 0–2)
BILIRUB SERPL-MCNC: 0.4 MG/DL (ref 0.2–1)
BUN SERPL-MCNC: 9.6 MG/DL (ref 7–18)
CALCIUM SERPL-MCNC: 8.7 MG/DL (ref 8.5–10.1)
CHLORIDE SERPL-SCNC: 102 MMOL/L (ref 98–107)
CO2 SERPL-SCNC: 28 MMOL/L (ref 21–32)
CREAT SERPL-MCNC: 0.7 MG/DL (ref 0.55–1.3)
DEPRECATED RDW RBC AUTO: 12.6 % (ref 11.9–15.9)
EOSINOPHIL # BLD: 0 % (ref 0–4.5)
GLUCOSE SERPL-MCNC: 104 MG/DL (ref 74–106)
HCT VFR BLD CALC: 41.7 % (ref 35.4–49)
HGB BLD-MCNC: 14.3 GM/DL (ref 11.7–16.9)
LYMPHOCYTES # BLD: 30.7 % (ref 8–40)
MAGNESIUM SERPL-MCNC: 1.9 MG/DL (ref 1.8–2.4)
MCH RBC QN AUTO: 28.5 PG (ref 25.7–33.7)
MCHC RBC AUTO-ENTMCNC: 34.2 G/DL (ref 32–35.9)
MCV RBC: 83.3 FL (ref 80–96)
MONOCYTES # BLD AUTO: 8.5 % (ref 3.8–10.2)
NEUTROPHILS # BLD: 60.5 % (ref 42.8–82.8)
PLATELET # BLD AUTO: 230 10^3/UL (ref 134–434)
PMV BLD: 6.7 FL (ref 7.5–11.1)
POTASSIUM SERPLBLD-SCNC: 4.1 MMOL/L (ref 3.5–5.1)
PROT SERPL-MCNC: 6.8 G/DL (ref 6.4–8.2)
RBC # BLD AUTO: 5.01 M/MM3 (ref 4–5.6)
SODIUM SERPL-SCNC: 136 MMOL/L (ref 136–145)
WBC # BLD AUTO: 6.1 K/MM3 (ref 4–10)

## 2023-11-10 ENCOUNTER — OFFICE (OUTPATIENT)
Dept: URBAN - METROPOLITAN AREA CLINIC 30 | Facility: CLINIC | Age: 63
Setting detail: OPHTHALMOLOGY
End: 2023-11-10
Payer: COMMERCIAL

## 2023-11-10 DIAGNOSIS — H40.003: ICD-10-CM

## 2023-11-10 PROCEDURE — 92020 GONIOSCOPY: CPT | Performed by: OPHTHALMOLOGY

## 2023-11-10 PROCEDURE — 92004 COMPRE OPH EXAM NEW PT 1/>: CPT | Performed by: OPHTHALMOLOGY

## 2023-11-10 PROCEDURE — 92250 FUNDUS PHOTOGRAPHY W/I&R: CPT | Performed by: OPHTHALMOLOGY

## 2023-11-10 ASSESSMENT — REFRACTION_AUTOREFRACTION
OS_SPHERE: +1.00
OD_CYLINDER: +0.75
OS_CYLINDER: +1.00
OD_SPHERE: +0.75
OD_AXIS: 158
OS_AXIS: 013

## 2023-11-10 ASSESSMENT — CONFRONTATIONAL VISUAL FIELD TEST (CVF)
OD_FINDINGS: FULL
OS_FINDINGS: FULL

## 2023-11-10 ASSESSMENT — SPHEQUIV_DERIVED
OS_SPHEQUIV: 1.5
OD_SPHEQUIV: 1.125

## 2024-10-31 ENCOUNTER — OFFICE (OUTPATIENT)
Facility: LOCATION | Age: 64
Setting detail: OPHTHALMOLOGY
End: 2024-10-31
Payer: COMMERCIAL

## 2024-10-31 DIAGNOSIS — H01.004: ICD-10-CM

## 2024-10-31 DIAGNOSIS — H40.033: ICD-10-CM

## 2024-10-31 DIAGNOSIS — H11.153: ICD-10-CM

## 2024-10-31 DIAGNOSIS — H01.001: ICD-10-CM

## 2024-10-31 PROBLEM — H43.811 POSTERIOR VITREOUS DETACHMENT; RIGHT EYE: Status: ACTIVE | Noted: 2024-10-31

## 2024-10-31 PROBLEM — H16.223 DRY EYE SYNDROME K SICCA; BOTH EYES: Status: ACTIVE | Noted: 2024-10-31

## 2024-10-31 PROCEDURE — 92020 GONIOSCOPY: CPT | Performed by: OPHTHALMOLOGY

## 2024-10-31 PROCEDURE — 92014 COMPRE OPH EXAM EST PT 1/>: CPT | Performed by: OPHTHALMOLOGY

## 2024-10-31 ASSESSMENT — VISUAL ACUITY
OS_BCVA: 20/30-2
OD_BCVA: 20/40

## 2024-10-31 ASSESSMENT — REFRACTION_CURRENTRX
OS_OVR_VA: 20/
OD_OVR_VA: 20/

## 2025-06-18 ENCOUNTER — OFFICE (OUTPATIENT)
Facility: LOCATION | Age: 65
Setting detail: OPHTHALMOLOGY
End: 2025-06-18
Payer: MEDICARE

## 2025-06-18 DIAGNOSIS — H01.001: ICD-10-CM

## 2025-06-18 DIAGNOSIS — H01.004: ICD-10-CM

## 2025-06-18 DIAGNOSIS — H40.033: ICD-10-CM

## 2025-06-18 DIAGNOSIS — H16.223: ICD-10-CM

## 2025-06-18 PROBLEM — H10.45 ALLERGIC CONJUNCTIVITIS: Status: ACTIVE | Noted: 2025-06-18

## 2025-06-18 PROCEDURE — 92012 INTRM OPH EXAM EST PATIENT: CPT | Performed by: OPHTHALMOLOGY

## 2025-06-18 ASSESSMENT — TEAR BREAK UP TIME (TBUT)
OD_TBUT: 2+
OS_TBUT: 2+

## 2025-06-18 ASSESSMENT — LID EXAM ASSESSMENTS
OD_BLEPHARITIS: RUL 1+
OS_BLEPHARITIS: LUL 1+

## 2025-06-18 ASSESSMENT — TONOMETRY
OS_IOP_MMHG: 16
OD_IOP_MMHG: 16

## 2025-06-18 ASSESSMENT — REFRACTION_AUTOREFRACTION
OS_CYLINDER: +1.00
OD_AXIS: 160
OS_SPHERE: +1.50
OD_SPHERE: +1.00
OD_CYLINDER: +0.75
OS_AXIS: 005

## 2025-06-18 ASSESSMENT — SUPERFICIAL PUNCTATE KERATITIS (SPK)
OS_SPK: 1+
OD_SPK: 1+

## 2025-06-18 ASSESSMENT — CONFRONTATIONAL VISUAL FIELD TEST (CVF)
OD_FINDINGS: FULL
OS_FINDINGS: FULL

## 2025-06-18 ASSESSMENT — REFRACTION_CURRENTRX
OD_OVR_VA: 20/
OS_OVR_VA: 20/

## 2025-06-18 ASSESSMENT — VISUAL ACUITY
OS_BCVA: 20/30
OD_BCVA: 20/40